# Patient Record
Sex: MALE | Race: OTHER | URBAN - METROPOLITAN AREA
[De-identification: names, ages, dates, MRNs, and addresses within clinical notes are randomized per-mention and may not be internally consistent; named-entity substitution may affect disease eponyms.]

---

## 2019-07-12 ENCOUNTER — OFFICE VISIT (OUTPATIENT)
Dept: INTERNAL MEDICINE CLINIC | Age: 44
End: 2019-07-12
Payer: COMMERCIAL

## 2019-07-12 VITALS
WEIGHT: 188 LBS | DIASTOLIC BLOOD PRESSURE: 60 MMHG | BODY MASS INDEX: 28.49 KG/M2 | HEART RATE: 80 BPM | SYSTOLIC BLOOD PRESSURE: 100 MMHG | RESPIRATION RATE: 12 BRPM | HEIGHT: 68 IN

## 2019-07-12 DIAGNOSIS — Z12.11 SPECIAL SCREENING FOR MALIGNANT NEOPLASMS, COLON: ICD-10-CM

## 2019-07-12 DIAGNOSIS — Z00.00 PERIODIC HEALTH ASSESSMENT, GENERAL SCREENING, ADULT: ICD-10-CM

## 2019-07-12 DIAGNOSIS — Z00.00 ROUTINE GENERAL MEDICAL EXAMINATION AT A HEALTH CARE FACILITY: Primary | ICD-10-CM

## 2019-07-12 LAB
BILIRUBIN, POC: NORMAL
BLOOD URINE, POC: NORMAL
CLARITY, POC: CLEAR
COLOR, POC: YELLOW
GLUCOSE URINE, POC: NORMAL
KETONES, POC: NORMAL
LEUKOCYTE EST, POC: NORMAL
NITRITE, POC: NORMAL
PH, POC: 6
PROTEIN, POC: NORMAL
SPECIFIC GRAVITY, POC: 1.02
UROBILINOGEN, POC: NORMAL

## 2019-07-12 PROCEDURE — 93000 ELECTROCARDIOGRAM COMPLETE: CPT | Performed by: INTERNAL MEDICINE

## 2019-07-12 PROCEDURE — 99386 PREV VISIT NEW AGE 40-64: CPT | Performed by: INTERNAL MEDICINE

## 2019-07-12 PROCEDURE — 81002 URINALYSIS NONAUTO W/O SCOPE: CPT | Performed by: INTERNAL MEDICINE

## 2019-07-12 ASSESSMENT — PATIENT HEALTH QUESTIONNAIRE - PHQ9
SUM OF ALL RESPONSES TO PHQ9 QUESTIONS 1 & 2: 0
SUM OF ALL RESPONSES TO PHQ QUESTIONS 1-9: 0
1. LITTLE INTEREST OR PLEASURE IN DOING THINGS: 0
2. FEELING DOWN, DEPRESSED OR HOPELESS: 0
SUM OF ALL RESPONSES TO PHQ QUESTIONS 1-9: 0

## 2019-07-12 NOTE — PROGRESS NOTES
Denisse Benjamin 39 y.o. presents today with   Chief Complaint   Patient presents with    Annual Exam       History reviewed. No pertinent family history. Social History     Socioeconomic History    Marital status: Not on file     Spouse name: Not on file    Number of children: Not on file    Years of education: Not on file    Highest education level: Not on file   Occupational History    Not on file   Social Needs    Financial resource strain: Not on file    Food insecurity:     Worry: Not on file     Inability: Not on file    Transportation needs:     Medical: Not on file     Non-medical: Not on file   Tobacco Use    Smoking status: Never Smoker    Smokeless tobacco: Never Used   Substance and Sexual Activity    Alcohol use: Not on file    Drug use: Not on file    Sexual activity: Not on file   Lifestyle    Physical activity:     Days per week: Not on file     Minutes per session: Not on file    Stress: Not on file   Relationships    Social connections:     Talks on phone: Not on file     Gets together: Not on file     Attends Worship service: Not on file     Active member of club or organization: Not on file     Attends meetings of clubs or organizations: Not on file     Relationship status: Not on file    Intimate partner violence:     Fear of current or ex partner: Not on file     Emotionally abused: Not on file     Physically abused: Not on file     Forced sexual activity: Not on file   Other Topics Concern    Not on file   Social History Narrative    Not on file       There is no problem list on file for this patient. History reviewed. No pertinent past medical history. History reviewed. No pertinent surgical history. No current outpatient medications on file. No current facility-administered medications for this visit. No Known Allergies    Review of Systems: There is no immunization history on file for this patient. Eye Exam: 2019 by Rosa Fleming and [employer]

## 2019-07-13 LAB
ALBUMIN SERPL-MCNC: 3.9 G/DL (ref 3.5–5)
ALP BLD-CCNC: 65 IU/L (ref 35–135)
ALT SERPL-CCNC: 22 IU/L (ref 10–60)
ANION GAP SERPL CALCULATED.3IONS-SCNC: 8 MMOL/L (ref 6–18)
AST SERPL-CCNC: 22 IU/L (ref 10–40)
BASOPHILS ABSOLUTE: 0.04 THOU/MCL (ref 0–0.2)
BASOPHILS ABSOLUTE: 1 %
BILIRUB SERPL-MCNC: 0.8 MG/DL (ref 0–1.2)
BUN BLDV-MCNC: 9 MG/DL (ref 8–26)
CALCIUM SERPL-MCNC: 8.5 MG/DL (ref 8.5–10.5)
CHLORIDE BLD-SCNC: 103 MEQ/L (ref 101–111)
CHOLESTEROL, TOTAL: 206 MG/DL
CHOLESTEROL: 176 MG/DL
CO2: 24 MMOL/L (ref 24–36)
CREAT SERPL-MCNC: 1.05 MG/DL (ref 0.64–1.27)
EOSINOPHILS ABSOLUTE: 0.26 THOU/MCL (ref 0.03–0.45)
EOSINOPHILS RELATIVE PERCENT: 5 %
GFR AFRICAN AMERICAN: 98 ML/MIN/1.73 M2
GFR NON-AFRICAN AMERICAN: 85 ML/MIN/1.73 M2
GLUCOSE BLD-MCNC: 97 MG/DL (ref 70–99)
HCT VFR BLD CALC: 44.4 % (ref 40–50)
HDLC SERPL-MCNC: 30 MG/DL
HEMOGLOBIN: 15.3 G/DL (ref 13.5–16.5)
HIV 1+2 AB+HIV1P24 AG, EIA: NONREACTIVE
LDL CHOLESTEROL CALCULATED: 145 MG/DL
LYMPHOCYTES ABSOLUTE: 1.9 THOU/MCL (ref 1–4)
LYMPHOCYTES RELATIVE PERCENT: 33 %
MCH RBC QN AUTO: 28.5 PG (ref 27–33)
MCHC RBC AUTO-ENTMCNC: 34.4 G/DL (ref 32–36)
MCV RBC AUTO: 82.8 FL (ref 82–97)
MONOCYTES # BLD: 7 %
MONOCYTES ABSOLUTE: 0.4 THOU/MCL (ref 0.2–0.9)
NEUTROPHILS ABSOLUTE: 3.13 THOU/MCL (ref 1.8–7.7)
PDW BLD-RTO: 12.9 %
PLATELET # BLD: 269 THOU/MCL (ref 140–375)
PMV BLD AUTO: 7.9 FL (ref 7.4–11.5)
POTASSIUM SERPL-SCNC: 4.3 MEQ/L (ref 3.6–5.1)
RBC # BLD: 5.36 MIL/MCL (ref 4.4–5.8)
SEG NEUTROPHILS: 54 %
SODIUM BLD-SCNC: 135 MEQ/L (ref 135–145)
TOTAL PROTEIN: 6.9 G/DL (ref 6–8)
TRIGL SERPL-MCNC: 155 MG/DL
TSH ULTRASENSITIVE: 1.96 MIU/L (ref 0.27–4.2)
WBC # BLD: 5.7 THOU/MCL (ref 3.6–10.5)

## 2019-07-22 ENCOUNTER — TELEPHONE (OUTPATIENT)
Dept: INTERNAL MEDICINE CLINIC | Age: 44
End: 2019-07-22

## 2021-11-22 ENCOUNTER — OFFICE VISIT (OUTPATIENT)
Dept: ORTHOPEDIC SURGERY | Age: 46
End: 2021-11-22
Payer: COMMERCIAL

## 2021-11-22 VITALS — BODY MASS INDEX: 28.49 KG/M2 | HEIGHT: 68 IN | WEIGHT: 188 LBS

## 2021-11-22 DIAGNOSIS — M50.30 DDD (DEGENERATIVE DISC DISEASE), CERVICAL: ICD-10-CM

## 2021-11-22 DIAGNOSIS — M54.2 NECK PAIN: Primary | ICD-10-CM

## 2021-11-22 PROCEDURE — 99203 OFFICE O/P NEW LOW 30 MIN: CPT | Performed by: PHYSICIAN ASSISTANT

## 2021-11-22 RX ORDER — METHYLPREDNISOLONE 4 MG/1
TABLET ORAL
Qty: 1 KIT | Refills: 0 | Status: SHIPPED | OUTPATIENT
Start: 2021-11-22 | End: 2021-11-28

## 2021-11-22 NOTE — PROGRESS NOTES
Review of Systems   Musculoskeletal: Positive for neck pain. All other systems reviewed and are negative.

## 2021-12-07 NOTE — PROGRESS NOTES
12 [facility]  History and Physical  Shoulder Pain    Date: 21  Name:  Kiara Wadsworth  Address:  [address] [address]    :  1975      Age:   54 y.o.    SSN:  [id number]      Medical Record Number:  <[id number]>    Reason for Visit:    New Patient (Cervical/neck pain)      HPI:   Kiara Wadsworth is a 54 y.o. male who presents to our office today complaining of pain in the cervical spine. He reports he has had pain in his neck that has been on and off for nearly 10 years but feels that the last 2 years have been much worse. He has seek medical care and has undergone chiropractic care, formal physical therapy stem treatment and has used heat pack and oral anti-inflammatory agents on an as-needed basis. Patient reports that most of his pain is confined to the neck and occurs primarily when he is looking down at his phone or his laptop or a block. Patient does work from home and needs to be on his laptop quite frequently for work. He reports within a few minutes of flexing his neck downwards towards the laptop screen his pain becomes excruciating. He has not gone under any steroid treatment as of yet. He denies any radicular symptoms down the arm. Pain Assessment  Location of Pain: Neck  Location Modifiers: Posterior  Severity of Pain: 4  Quality of Pain: Aching  Duration of Pain: Persistent  Frequency of Pain: Constant  Aggravating Factors: Bending  Limiting Behavior: Some  Relieving Factors: Rest, Heat  Result of Injury: No  Work-Related Injury: No  Are there other pain locations you wish to document?: No    Review of Systems:  A 14 point review of systems available in the scanned medical record as documented by the patient. The review is negative with the exception of those things mentioned in the History of Present Illness and Past Medical History.       Past History:  Past Medical History:   Diagnosis Date    Facial nerve spasm 2017    Dr. Mark Connors - Botox injections    Hyperlipidemia 2017     History reviewed. No pertinent surgical history. No current outpatient medications on file prior to visit. No current facility-administered medications on file prior to visit. Social History     Socioeconomic History    Marital status:      Spouse name: Simba Krishnan Number of children: 2    Years of education: Not on file    Highest education level: Not on file   Occupational History    Occupation: [employer]     Employer: [employer]   Tobacco Use    Smoking status: Never Smoker    Smokeless tobacco: Never Used   Vaping Use    Vaping Use: Never used   Substance and Sexual Activity    Alcohol use: Not on file    Drug use: Not on file    Sexual activity: Not on file   Other Topics Concern    Not on file   Social History Narrative    Living Will:  No     Social Determinants of Health     Financial Resource Strain:     Difficulty of Paying Living Expenses: Not on file   Food Insecurity:     Worried About [address] in the Last Year: Not on file    [address] in the Last Year: Not on file   Transportation Needs:     Lack of Transportation (Medical): Not on file    Lack of Transportation (Non-Medical):  Not on file   Physical Activity:     Days of Exercise per Week: Not on file    Minutes of Exercise per Session: Not on file   Stress:     Feeling of Stress : Not on file   Social Connections:     Frequency of Communication with Friends and Family: Not on file    Frequency of Social Gatherings with Friends and Family: Not on file    Attends Synagogue Services: Not on file    Active Member of Clubs or Organizations: Not on file    Attends Club or Organization Meetings: Not on file    Marital Status: Not on file   Intimate Partner Violence:     Fear of Current or Ex-Partner: Not on file    Emotionally Abused: Not on file    Physically Abused: Not on file    Sexually Abused: Not on file   Housing Stability:  Unable to Pay for Housing in the Last Year: Not on file    Number of Places Lived in the Last Year: Not on file    Unstable Housing in the Last Year: Not on file     Family History   Problem Relation Age of Onset    Other Mother 76        Alive and well    Other Father 67        Alive and well       Current Medications:    No current outpatient medications on file. No current facility-administered medications for this visit. Allergies:  No Known Allergies    Physical Exam:  Vitals:     General: Denisse Burger is a healthy and well appearing 54 y.o. male who is sitting comfortably in our office in acute distress. General Exam:   Constitutional: Patient is adequately groomed with no evidence of malnutrition  DTRs: Deep tendon reflexes are intact  Mental Status: The patient is oriented to time, place and person. The patient's mood and affect are appropriate. Lymphatic: The lymphatic examination bilaterally reveals all areas to be without enlargement or induration. Vascular: Examination reveals no swelling or calf tenderness. Peripheral pulses are palpable and 2+. Neurological: The patient has good coordination. There is no weakness or sensory deficit. Neuro: alert. Oriented X 3  Eyes: Extra-ocular muscles intact  Mouth: Oral mucosa moist. No perioral lesions  Pulm: Respirations unlabored and regular. B/l Shoulder Exam:    Examination of the  extremity does not show any tenderness, deformity or injury. Range of motion is unremarkable. There is no gross instability. There are no rashes, ulcerations or lesions. Strength and tone are normal.    CERVICAL SPINE EXAMINATION:     Inspection:  Local inspection shows no step-off or bruising. Cervical alignment is normal.     Palpation:  No evidence of tenderness. There is mild paraspinal spasm. Range of Motion: All restriction of flexion and lateral bending.       Strength: 5/5 bilateral upper extremities     Skin: Intact without rashes ulcerations or lesions. Sensation: Intact to light touch  Laboratory:  No visits with results within 14 Day(s) from this visit.    Latest known visit with results is:   Office Visit on 07/12/2019   Component Date Value    Color, UA 07/12/2019 yellow     Clarity, UA 07/12/2019 clear     Glucose, UA POC 07/12/2019 neg     Bilirubin, UA 07/12/2019 neg     Ketones, UA 07/12/2019 neg     Spec Grav, UA 07/12/2019 1.020     Blood, UA POC 07/12/2019 neg     pH, UA 07/12/2019 6.0     Protein, UA POC 07/12/2019 neg     Urobilinogen, UA 07/12/2019 neg     Leukocytes, UA 07/12/2019 neg     Nitrite, UA 07/12/2019 neg     WBC 07/13/2019 5.7     RBC 07/13/2019 5.36     Hemoglobin 07/13/2019 15.3     Hematocrit 07/13/2019 44.4     MCV 07/13/2019 82.8     MCH 07/13/2019 28.5     MCHC 07/13/2019 34.4     RDW 07/13/2019 12.9     Platelets 25/21/6082 269     MPV 07/13/2019 7.9     Neutrophils Absolute 07/13/2019 3.13     Lymphocytes Absolute 07/13/2019 1.90     Monocytes Absolute 07/13/2019 0.40     Eosinophils Absolute 07/13/2019 0.26     Basophils Absolute 07/13/2019 0.04     Seg Neutrophils 07/13/2019 54     Lymphocytes % 07/13/2019 33     Monocytes 07/13/2019 7     Eosinophils % 07/13/2019 5     Basophils Absolute 07/13/2019 1     Cholesterol, Total 07/13/2019 206*    Triglycerides 07/13/2019 155*    HDL 07/13/2019 30*    LDL Calculated 07/13/2019 145*    Cholesterol 07/13/2019 176*    Sodium 07/13/2019 135     Potassium 07/13/2019 4.3     Chloride 07/13/2019 103     CO2 07/13/2019 24     Glucose 07/13/2019 97     BUN 07/13/2019 9     CREATININE 07/13/2019 1.05     Calcium 07/13/2019 8.5     Total Protein 07/13/2019 6.9     Albumin 07/13/2019 3.9     Total Bilirubin 07/13/2019 0.8     Alkaline Phosphatase 07/13/2019 65     AST 07/13/2019 22     ALT 07/13/2019 22     GFR Non- 07/13/2019 85     GFR  07/13/2019 98     Anion Gap 07/13/2019 8  TSH 07/13/2019 1.960     HIV 1+2 [id number] AG, E* 07/13/2019 NONREACTIVE       No results found for this or any previous visit (from the past 24 hour(s)). Radiographic:  3 xray views of the cervical spine including AP and lateral views were obtained and reviewed in the office today which did not show any acute bony abnormalities. Self assessment questionnaires including ASES and Simple Shoulder Test were completed today. Assessment:  Jostin Diaz is a 54 y.o. male with degenerative disc disease of the cervical spine. Impression:  Encounter Diagnoses   Name Primary?  Neck pain Yes    DDD (degenerative disc disease), cervical        Office Procedures:  Orders Placed This Encounter   Procedures    XR CERVICAL SPINE (2-3 VIEWS)     Standing Status:   Future     Number of Occurrences:   1     Standing Expiration Date:   11/22/2022     Order Specific Question:   Reason for exam:     Answer:   pain    [facility]     Referral Priority:   Routine     Referral Type:   Eval and Treat     Referral Reason:   Specialty Services Required     Requested Specialty:   Physical Therapy     Number of Visits Requested:   1       Plan: We recommend a course of oral steroids to help with the inflammation. We will also couple this with a formal physical therapy program.  We will have him go to the [facility] office for physical therapy. He continue to take oral anti-inflammatory agents as tolerated. Encouraged to use heat and topical creams like Voltaren gel. If he continues to have persistent symptoms despite these treatment modalities he was asked to follow-up with a spine specialist.  We did give him information regarding a PM&R physician near him. All his questions were fully answered today.       12/7/2021  10:59 AM      Verenice Stapleton PA-C  Orthopaedic Sports Medicine Physician Assistant      This dictation was performed with a verbal recognition program [facility]) and it was checked for errors. It is possible that there are still dictated errors within this office note. If so, please bring any errors to my attention for an addendum. All efforts were made to ensure that this office note is accurate.